# Patient Record
Sex: FEMALE | Race: WHITE | NOT HISPANIC OR LATINO
[De-identification: names, ages, dates, MRNs, and addresses within clinical notes are randomized per-mention and may not be internally consistent; named-entity substitution may affect disease eponyms.]

---

## 2019-07-31 PROBLEM — Z00.00 ENCOUNTER FOR PREVENTIVE HEALTH EXAMINATION: Status: ACTIVE | Noted: 2019-07-31

## 2019-08-09 ENCOUNTER — APPOINTMENT (OUTPATIENT)
Dept: BREAST CENTER | Facility: CLINIC | Age: 84
End: 2019-08-09
Payer: MEDICARE

## 2019-08-09 VITALS
BODY MASS INDEX: 23.75 KG/M2 | SYSTOLIC BLOOD PRESSURE: 159 MMHG | DIASTOLIC BLOOD PRESSURE: 73 MMHG | HEIGHT: 60 IN | WEIGHT: 121 LBS | HEART RATE: 57 BPM

## 2019-08-09 DIAGNOSIS — Z86.59 PERSONAL HISTORY OF OTHER MENTAL AND BEHAVIORAL DISORDERS: ICD-10-CM

## 2019-08-09 DIAGNOSIS — Z87.898 PERSONAL HISTORY OF OTHER SPECIFIED CONDITIONS: ICD-10-CM

## 2019-08-09 DIAGNOSIS — R68.89 OTHER GENERAL SYMPTOMS AND SIGNS: ICD-10-CM

## 2019-08-09 DIAGNOSIS — Z80.3 FAMILY HISTORY OF MALIGNANT NEOPLASM OF BREAST: ICD-10-CM

## 2019-08-09 DIAGNOSIS — Z85.118 PERSONAL HISTORY OF OTHER MALIGNANT NEOPLASM OF BRONCHUS AND LUNG: ICD-10-CM

## 2019-08-09 PROCEDURE — 99205 OFFICE O/P NEW HI 60 MIN: CPT

## 2019-08-09 RX ORDER — ARIPIPRAZOLE 2 MG/1
2 TABLET ORAL
Refills: 0 | Status: ACTIVE | COMMUNITY

## 2019-08-09 RX ORDER — AMLODIPINE BESYLATE 5 MG/1
TABLET ORAL
Refills: 0 | Status: ACTIVE | COMMUNITY

## 2019-08-09 RX ORDER — ASPIRIN 81 MG
81 TABLET, DELAYED RELEASE (ENTERIC COATED) ORAL
Refills: 0 | Status: ACTIVE | COMMUNITY

## 2019-08-09 RX ORDER — CARVEDILOL 3.12 MG/1
TABLET, FILM COATED ORAL
Refills: 0 | Status: ACTIVE | COMMUNITY

## 2019-08-09 RX ORDER — ATORVASTATIN CALCIUM 80 MG/1
TABLET, FILM COATED ORAL
Refills: 0 | Status: ACTIVE | COMMUNITY

## 2019-08-09 RX ORDER — BUPROPION HYDROCHLORIDE 75 MG/1
TABLET, FILM COATED ORAL
Refills: 0 | Status: ACTIVE | COMMUNITY

## 2019-08-09 RX ORDER — LOSARTAN POTASSIUM 100 MG/1
TABLET, FILM COATED ORAL
Refills: 0 | Status: ACTIVE | COMMUNITY

## 2019-08-09 RX ORDER — DONEPEZIL HYDROCHLORIDE 10 MG/1
10 TABLET ORAL
Refills: 0 | Status: ACTIVE | COMMUNITY

## 2019-08-09 RX ORDER — MIRTAZAPINE 7.5 MG/1
TABLET, FILM COATED ORAL
Refills: 0 | Status: ACTIVE | COMMUNITY

## 2019-08-09 NOTE — HISTORY OF PRESENT ILLNESS
[FreeTextEntry1] : Pt w/ L FE lesion detected on Scr Mammo/Sono (7/12/19)\par Mammo/Sono (7/12/19): +new 0.8cm hypoe nod L 2:00 N11 > Bx rec'ed\par S/P L Sono Core Bx (L 2:00)(7/23/19): +FE Lesion w/ cellular stroma\par S/P L Robotic Lobectomy ([facility] [place]): Lung Ca > No RT/chemo\par +FH BR Ca (Daughter 37 (BRCA-))\par No prior Breast Surgery, Breast Procedures or Nipple Discharge. \par No FH Ovarian, Pancreatic Cancer or Melanoma.

## 2019-08-09 NOTE — REVIEW OF SYSTEMS
[Recent Weight Loss (___ Lbs)] : recent [unfilled] ~Ulb weight loss [Difficulty Walking] : difficulty walking [Negative] : Heme/Lymph

## 2019-08-09 NOTE — PHYSICAL EXAM
[Atraumatic] : atraumatic [Normocephalic] : normocephalic [Supple] : supple [No Supraclavicular Adenopathy] : no supraclavicular adenopathy [No Thyromegaly] : no thyromegaly [No Cervical Adenopathy] : no cervical adenopathy [Normal Sinus Rhythm] : normal sinus rhythm [Examined in the supine and seated position] : examined in the supine and seated position [No dominant masses] : no dominant masses in right breast  [No dominant masses] : no dominant masses left breast [No Nipple Retraction] : no left nipple retraction [No Nipple Discharge] : no left nipple discharge [No Axillary Lymphadenopathy] : no left axillary lymphadenopathy [No Edema] : no edema [No Rashes] : no rashes [No Ulceration] : no ulceration [de-identified] : +vague nod th L 2:00 N11 c/t Bx site\par No other sig fx's.

## 2019-09-13 ENCOUNTER — APPOINTMENT (OUTPATIENT)
Dept: BREAST CENTER | Facility: HOSPITAL | Age: 84
End: 2019-09-13
Payer: MEDICARE

## 2019-09-13 PROCEDURE — 19125 EXCISION BREAST LESION: CPT | Mod: LT

## 2019-09-23 ENCOUNTER — APPOINTMENT (OUTPATIENT)
Dept: BREAST CENTER | Facility: CLINIC | Age: 84
End: 2019-09-23
Payer: MEDICARE

## 2019-09-23 DIAGNOSIS — N63.20 UNSPECIFIED LUMP IN THE LEFT BREAST, UNSPECIFIED QUADRANT: ICD-10-CM

## 2019-09-23 PROCEDURE — 99024 POSTOP FOLLOW-UP VISIT: CPT

## 2019-09-23 NOTE — PHYSICAL EXAM
[de-identified] : Pt w/o c/o\par Incision C&D. Mod induration/ecchymosis. PDS d/c'ed.\par Stable. Pathology pending.

## 2019-09-23 NOTE — HISTORY OF PRESENT ILLNESS
[FreeTextEntry1] : S/P L Br Bx w/ NL (9/13/19): p\par Pt w/ L FE lesion detected on Scr Mammo/Sono (7/12/19)\par Mammo/Sono (7/12/19): +new 0.8cm hypoe nod L 2:00 N11 > Bx rec'ed\par S/P L Sono Core Bx (L 2:00)(7/23/19): +FE Lesion w/ cellular stroma\par S/P L Robotic Lobectomy ([facility] [place]): Lung Ca > No RT/chemo\par +FH BR Ca (Daughter 37 (BRCA-))\par No prior Breast Surgery, Breast Procedures or Nipple Discharge. \par No FH Ovarian, Pancreatic Cancer or Melanoma.

## 2022-11-21 ENCOUNTER — OFFICE (OUTPATIENT)
Dept: URBAN - METROPOLITAN AREA CLINIC 122 | Facility: CLINIC | Age: 87
Setting detail: OPHTHALMOLOGY
End: 2022-11-21
Payer: MEDICARE

## 2022-11-21 ENCOUNTER — RX ONLY (RX ONLY)
Age: 87
End: 2022-11-21

## 2022-11-21 DIAGNOSIS — H26.492: ICD-10-CM

## 2022-11-21 PROCEDURE — 66821 AFTER CATARACT LASER SURGERY: CPT | Performed by: OPHTHALMOLOGY

## 2022-11-21 ASSESSMENT — REFRACTION_MANIFEST
OS_AXIS: 90
OD_CYLINDER: -0.75
OS_ADD: +2.50
OS_VA1: 20/25-
OS_CYLINDER: -0.50
OD_AXIS: 90
OD_SPHERE: +0.50
OD_VA1: 20/25-
OD_ADD: +2.50
OS_SPHERE: +0.50

## 2022-11-21 ASSESSMENT — REFRACTION_CURRENTRX
OS_OVR_VA: 20/
OS_CYLINDER: -0.50
OD_AXIS: 90
OD_SPHERE: +3.00
OD_VPRISM_DIRECTION: SV
OS_SPHERE: +3.00
OD_OVR_VA: 20/
OD_CYLINDER: -0.75
OS_VPRISM_DIRECTION: SV
OS_AXIS: 90

## 2022-11-21 ASSESSMENT — VISUAL ACUITY
OS_BCVA: 20/25-
OD_BCVA: 20/50+-

## 2022-11-21 ASSESSMENT — SUPERFICIAL PUNCTATE KERATITIS (SPK)
OD_SPK: 1+
OS_SPK: 1+

## 2022-11-21 ASSESSMENT — LID EXAM ASSESSMENTS
OS_DERMATOCHALASIS: 1+
OD_BLEPHARITIS: RUL 2+
OS_BLEPHARITIS: LUL 2+
OD_DERMATOCHALASIS: 1+

## 2022-11-21 ASSESSMENT — REFRACTION_AUTOREFRACTION
OD_AXIS: 90
OS_AXIS: 90
OS_SPHERE: PLANO
OS_CYLINDER: -1.00
OD_SPHERE: PLANO
OD_CYLINDER: -0.75

## 2022-11-21 ASSESSMENT — SPHEQUIV_DERIVED
OD_SPHEQUIV: 0.125
OS_SPHEQUIV: 0.25

## 2022-11-21 ASSESSMENT — CONFRONTATIONAL VISUAL FIELD TEST (CVF)
OD_FINDINGS: FULL
OS_FINDINGS: FULL

## 2022-11-21 ASSESSMENT — LID POSITION - DERMATOCHALASIS
OD_DERMATOCHALASIS: RUL 2+
OS_DERMATOCHALASIS: LUL 2+

## 2022-12-05 ENCOUNTER — OFFICE (OUTPATIENT)
Dept: URBAN - METROPOLITAN AREA CLINIC 122 | Facility: CLINIC | Age: 87
Setting detail: OPHTHALMOLOGY
End: 2022-12-05
Payer: MEDICARE

## 2022-12-05 DIAGNOSIS — H26.492: ICD-10-CM

## 2022-12-05 DIAGNOSIS — H16.223: ICD-10-CM

## 2022-12-05 PROCEDURE — 99024 POSTOP FOLLOW-UP VISIT: CPT | Performed by: OPHTHALMOLOGY

## 2022-12-05 ASSESSMENT — REFRACTION_AUTOREFRACTION
OD_SPHERE: PLANO
OS_AXIS: 103
OD_AXIS: 90
OD_CYLINDER: -0.75
OS_CYLINDER: -0.50
OS_SPHERE: +0.50

## 2022-12-05 ASSESSMENT — REFRACTION_MANIFEST
OD_ADD: +2.50
OD_SPHERE: +0.50
OD_CYLINDER: -0.75
OS_CYLINDER: -0.50
OS_ADD: +2.50
OS_SPHERE: +0.50
OD_VA1: 20/25-
OS_AXIS: 105
OS_VA1: 20/25
OD_AXIS: 90

## 2022-12-05 ASSESSMENT — REFRACTION_CURRENTRX
OS_CYLINDER: -0.50
OD_SPHERE: +3.00
OS_AXIS: 90
OD_VPRISM_DIRECTION: SV
OD_OVR_VA: 20/
OS_SPHERE: +3.00
OD_CYLINDER: -0.75
OS_VPRISM_DIRECTION: SV
OS_OVR_VA: 20/
OD_AXIS: 90

## 2022-12-05 ASSESSMENT — CONFRONTATIONAL VISUAL FIELD TEST (CVF)
OD_FINDINGS: FULL
OS_FINDINGS: FULL

## 2022-12-05 ASSESSMENT — LID POSITION - DERMATOCHALASIS
OS_DERMATOCHALASIS: LUL 2+
OD_DERMATOCHALASIS: RUL 2+

## 2022-12-05 ASSESSMENT — SPHEQUIV_DERIVED
OS_SPHEQUIV: 0.25
OD_SPHEQUIV: 0.125
OS_SPHEQUIV: 0.25

## 2022-12-05 ASSESSMENT — LID EXAM ASSESSMENTS
OS_BLEPHARITIS: LUL 2+
OS_DERMATOCHALASIS: 1+
OD_BLEPHARITIS: RUL 2+
OD_DERMATOCHALASIS: 1+

## 2022-12-05 ASSESSMENT — SUPERFICIAL PUNCTATE KERATITIS (SPK)
OS_SPK: 1+
OD_SPK: 1+

## 2022-12-05 ASSESSMENT — VISUAL ACUITY
OS_BCVA: 20/25-
OD_BCVA: 20/30

## 2023-03-13 ENCOUNTER — OFFICE (OUTPATIENT)
Dept: URBAN - METROPOLITAN AREA CLINIC 122 | Facility: CLINIC | Age: 88
Setting detail: OPHTHALMOLOGY
End: 2023-03-13
Payer: MEDICARE

## 2023-03-13 DIAGNOSIS — H26.492: ICD-10-CM

## 2023-03-13 DIAGNOSIS — H16.223: ICD-10-CM

## 2023-03-13 PROCEDURE — 68761 CLOSE TEAR DUCT OPENING: CPT | Performed by: OPHTHALMOLOGY

## 2023-03-13 PROCEDURE — 99213 OFFICE O/P EST LOW 20 MIN: CPT | Performed by: OPHTHALMOLOGY

## 2023-03-13 ASSESSMENT — REFRACTION_MANIFEST
OS_VA1: 20/25
OS_SPHERE: +0.50
OS_ADD: +2.50
OD_ADD: +2.50
OD_CYLINDER: -0.75
OD_VA1: 20/25-
OD_AXIS: 90
OS_AXIS: 105
OS_CYLINDER: -0.50
OD_SPHERE: +0.50

## 2023-03-13 ASSESSMENT — CONFRONTATIONAL VISUAL FIELD TEST (CVF)
OS_FINDINGS: FULL
OD_FINDINGS: FULL

## 2023-03-13 ASSESSMENT — REFRACTION_CURRENTRX
OD_CYLINDER: -0.75
OS_SPHERE: +3.00
OS_VPRISM_DIRECTION: SV
OD_AXIS: 90
OS_AXIS: 90
OD_OVR_VA: 20/
OS_CYLINDER: -0.50
OS_OVR_VA: 20/
OD_VPRISM_DIRECTION: SV
OD_SPHERE: +3.00

## 2023-03-13 ASSESSMENT — LID EXAM ASSESSMENTS
OD_BLEPHARITIS: RUL 2+
OS_BLEPHARITIS: LUL 2+
OS_DERMATOCHALASIS: 1+
OD_DERMATOCHALASIS: 1+

## 2023-03-13 ASSESSMENT — SPHEQUIV_DERIVED
OS_SPHEQUIV: 0.125
OS_SPHEQUIV: 0.25
OD_SPHEQUIV: 0.125
OD_SPHEQUIV: 0.625

## 2023-03-13 ASSESSMENT — REFRACTION_AUTOREFRACTION
OD_CYLINDER: -0.75
OD_AXIS: 116
OS_SPHERE: +0.25
OS_AXIS: 162
OD_SPHERE: +1.00
OS_CYLINDER: -0.25

## 2023-03-13 ASSESSMENT — SUPERFICIAL PUNCTATE KERATITIS (SPK)
OS_SPK: 2+
OD_SPK: 1+

## 2023-03-13 ASSESSMENT — VISUAL ACUITY
OS_BCVA: 20/25-
OD_BCVA: 20/40-2

## 2023-03-13 ASSESSMENT — LID POSITION - DERMATOCHALASIS
OS_DERMATOCHALASIS: LUL 2+
OD_DERMATOCHALASIS: RUL 2+

## 2023-04-03 ENCOUNTER — OFFICE (OUTPATIENT)
Dept: URBAN - METROPOLITAN AREA CLINIC 122 | Facility: CLINIC | Age: 88
Setting detail: OPHTHALMOLOGY
End: 2023-04-03
Payer: MEDICARE

## 2023-04-03 DIAGNOSIS — H43.391: ICD-10-CM

## 2023-04-03 DIAGNOSIS — H35.033: ICD-10-CM

## 2023-04-03 DIAGNOSIS — H16.223: ICD-10-CM

## 2023-04-03 DIAGNOSIS — H01.001: ICD-10-CM

## 2023-04-03 DIAGNOSIS — H02.834: ICD-10-CM

## 2023-04-03 DIAGNOSIS — H02.831: ICD-10-CM

## 2023-04-03 DIAGNOSIS — H26.492: ICD-10-CM

## 2023-04-03 DIAGNOSIS — H16.252: ICD-10-CM

## 2023-04-03 DIAGNOSIS — H01.004: ICD-10-CM

## 2023-04-03 DIAGNOSIS — H35.352: ICD-10-CM

## 2023-04-03 PROCEDURE — 99214 OFFICE O/P EST MOD 30 MIN: CPT | Performed by: OPHTHALMOLOGY

## 2023-04-03 PROCEDURE — 92134 CPTRZ OPH DX IMG PST SGM RTA: CPT | Performed by: OPHTHALMOLOGY

## 2023-04-03 ASSESSMENT — REFRACTION_AUTOREFRACTION
OD_SPHERE: +1.00
OD_CYLINDER: -1.00
OS_CYLINDER: -0.25
OS_AXIS: 162
OD_AXIS: 92
OS_SPHERE: +0.50

## 2023-04-03 ASSESSMENT — REFRACTION_CURRENTRX
OD_OVR_VA: 20/
OS_OVR_VA: 20/
OD_AXIS: 90
OS_SPHERE: +3.00
OD_CYLINDER: -0.75
OS_VPRISM_DIRECTION: SV
OS_AXIS: 90
OD_SPHERE: +3.00
OD_VPRISM_DIRECTION: SV
OS_CYLINDER: -0.50

## 2023-04-03 ASSESSMENT — REFRACTION_MANIFEST
OS_VA1: 20/25
OD_CYLINDER: -0.75
OS_CYLINDER: -0.50
OD_VA1: 20/25-
OS_AXIS: 105
OD_AXIS: 90
OD_ADD: +2.50
OS_ADD: +2.50
OS_SPHERE: +0.50
OD_SPHERE: +0.50

## 2023-04-03 ASSESSMENT — SUPERFICIAL PUNCTATE KERATITIS (SPK)
OD_SPK: T
OS_SPK: 2+

## 2023-04-03 ASSESSMENT — LID EXAM ASSESSMENTS
OS_DERMATOCHALASIS: 1+
OD_BLEPHARITIS: RUL 2+
OS_BLEPHARITIS: LUL 2+
OD_DERMATOCHALASIS: 1+

## 2023-04-03 ASSESSMENT — LID POSITION - DERMATOCHALASIS
OD_DERMATOCHALASIS: RUL 2+
OS_DERMATOCHALASIS: LUL 2+

## 2023-04-03 ASSESSMENT — CONFRONTATIONAL VISUAL FIELD TEST (CVF)
OS_FINDINGS: FULL
OD_FINDINGS: FULL

## 2023-04-03 ASSESSMENT — SPHEQUIV_DERIVED
OS_SPHEQUIV: 0.375
OD_SPHEQUIV: 0.5
OS_SPHEQUIV: 0.25
OD_SPHEQUIV: 0.125

## 2023-04-03 ASSESSMENT — VISUAL ACUITY
OD_BCVA: 20/80
OS_BCVA: 20/25-

## 2023-04-17 ENCOUNTER — OFFICE (OUTPATIENT)
Dept: URBAN - METROPOLITAN AREA CLINIC 122 | Facility: CLINIC | Age: 88
Setting detail: OPHTHALMOLOGY
End: 2023-04-17
Payer: MEDICARE

## 2023-04-17 ENCOUNTER — RX ONLY (RX ONLY)
Age: 88
End: 2023-04-17

## 2023-04-17 DIAGNOSIS — H26.492: ICD-10-CM

## 2023-04-17 DIAGNOSIS — H02.834: ICD-10-CM

## 2023-04-17 DIAGNOSIS — H43.391: ICD-10-CM

## 2023-04-17 DIAGNOSIS — H02.831: ICD-10-CM

## 2023-04-17 DIAGNOSIS — H01.001: ICD-10-CM

## 2023-04-17 DIAGNOSIS — H35.033: ICD-10-CM

## 2023-04-17 DIAGNOSIS — H35.352: ICD-10-CM

## 2023-04-17 DIAGNOSIS — H01.004: ICD-10-CM

## 2023-04-17 DIAGNOSIS — H16.252: ICD-10-CM

## 2023-04-17 PROCEDURE — 99213 OFFICE O/P EST LOW 20 MIN: CPT | Performed by: OPHTHALMOLOGY

## 2023-04-17 PROCEDURE — 92134 CPTRZ OPH DX IMG PST SGM RTA: CPT | Performed by: OPHTHALMOLOGY

## 2023-04-17 ASSESSMENT — REFRACTION_AUTOREFRACTION
OS_SPHERE: +0.50
OD_SPHERE: +1.00
OS_CYLINDER: -0.25
OD_AXIS: 92
OS_AXIS: 162
OD_CYLINDER: -1.00

## 2023-04-17 ASSESSMENT — REFRACTION_MANIFEST
OS_ADD: +2.50
OS_VA1: 20/25
OD_SPHERE: +0.50
OD_ADD: +2.50
OS_AXIS: 105
OD_VA1: 20/25-
OS_SPHERE: +0.50
OD_CYLINDER: -0.75
OD_AXIS: 90
OS_CYLINDER: -0.50

## 2023-04-17 ASSESSMENT — LID POSITION - DERMATOCHALASIS
OS_DERMATOCHALASIS: LUL 2+
OD_DERMATOCHALASIS: RUL 2+

## 2023-04-17 ASSESSMENT — SPHEQUIV_DERIVED
OS_SPHEQUIV: 0.25
OD_SPHEQUIV: 0.125
OS_SPHEQUIV: 0.375
OD_SPHEQUIV: 0.5

## 2023-04-17 ASSESSMENT — REFRACTION_CURRENTRX
OD_AXIS: 90
OS_VPRISM_DIRECTION: SV
OS_SPHERE: +3.00
OD_CYLINDER: -0.75
OS_OVR_VA: 20/
OD_OVR_VA: 20/
OS_AXIS: 90
OD_VPRISM_DIRECTION: SV
OD_SPHERE: +3.00
OS_CYLINDER: -0.50

## 2023-04-17 ASSESSMENT — LID EXAM ASSESSMENTS
OS_BLEPHARITIS: LUL 2+
OS_DERMATOCHALASIS: 1+
OD_BLEPHARITIS: RUL 2+
OD_DERMATOCHALASIS: 1+

## 2023-04-17 ASSESSMENT — SUPERFICIAL PUNCTATE KERATITIS (SPK)
OS_SPK: 2+
OD_SPK: T

## 2023-04-17 ASSESSMENT — VISUAL ACUITY
OS_BCVA: 20/25-
OD_BCVA: 20/100-

## 2023-07-17 ENCOUNTER — OFFICE (OUTPATIENT)
Dept: URBAN - METROPOLITAN AREA CLINIC 122 | Facility: CLINIC | Age: 88
Setting detail: OPHTHALMOLOGY
End: 2023-07-17
Payer: MEDICARE

## 2023-07-17 DIAGNOSIS — H16.223: ICD-10-CM

## 2023-07-17 DIAGNOSIS — H16.252: ICD-10-CM

## 2023-07-17 DIAGNOSIS — H02.834: ICD-10-CM

## 2023-07-17 DIAGNOSIS — H02.831: ICD-10-CM

## 2023-07-17 DIAGNOSIS — H43.391: ICD-10-CM

## 2023-07-17 DIAGNOSIS — H26.492: ICD-10-CM

## 2023-07-17 DIAGNOSIS — H01.004: ICD-10-CM

## 2023-07-17 DIAGNOSIS — H01.001: ICD-10-CM

## 2023-07-17 DIAGNOSIS — H35.033: ICD-10-CM

## 2023-07-17 DIAGNOSIS — H35.352: ICD-10-CM

## 2023-07-17 PROCEDURE — 92134 CPTRZ OPH DX IMG PST SGM RTA: CPT | Performed by: OPHTHALMOLOGY

## 2023-07-17 PROCEDURE — 99213 OFFICE O/P EST LOW 20 MIN: CPT | Performed by: OPHTHALMOLOGY

## 2023-07-17 ASSESSMENT — REFRACTION_AUTOREFRACTION
OS_CYLINDER: -0.25
OD_AXIS: 92
OS_AXIS: 162
OD_SPHERE: +1.00
OS_SPHERE: +0.50
OD_CYLINDER: -1.00

## 2023-07-17 ASSESSMENT — REFRACTION_CURRENTRX
OS_AXIS: 90
OS_SPHERE: +3.00
OD_AXIS: 90
OS_CYLINDER: -0.50
OS_VPRISM_DIRECTION: SV
OD_VPRISM_DIRECTION: SV
OS_OVR_VA: 20/
OD_SPHERE: +3.00
OD_OVR_VA: 20/
OD_CYLINDER: -0.75

## 2023-07-17 ASSESSMENT — REFRACTION_MANIFEST
OS_AXIS: 105
OS_ADD: +2.50
OS_CYLINDER: -0.50
OD_AXIS: 90
OD_VA1: 20/25-
OS_VA1: 20/25
OS_SPHERE: +0.50
OD_ADD: +2.50
OD_CYLINDER: -0.75
OD_SPHERE: +0.50

## 2023-07-17 ASSESSMENT — SUPERFICIAL PUNCTATE KERATITIS (SPK)
OD_SPK: T
OS_SPK: 2+

## 2023-07-17 ASSESSMENT — VISUAL ACUITY
OD_BCVA: 20/25+
OS_BCVA: 20/20

## 2023-07-17 ASSESSMENT — LID POSITION - DERMATOCHALASIS
OS_DERMATOCHALASIS: LUL 2+
OD_DERMATOCHALASIS: RUL 2+

## 2023-07-17 ASSESSMENT — LID EXAM ASSESSMENTS
OS_DERMATOCHALASIS: 1+
OD_BLEPHARITIS: RUL 2+
OD_DERMATOCHALASIS: 1+
OS_BLEPHARITIS: LUL 2+

## 2023-07-17 ASSESSMENT — SPHEQUIV_DERIVED
OS_SPHEQUIV: 0.375
OD_SPHEQUIV: 0.125
OD_SPHEQUIV: 0.5
OS_SPHEQUIV: 0.25

## 2023-10-18 ENCOUNTER — OFFICE (OUTPATIENT)
Dept: URBAN - METROPOLITAN AREA CLINIC 122 | Facility: CLINIC | Age: 88
Setting detail: OPHTHALMOLOGY
End: 2023-10-18
Payer: MEDICARE

## 2023-10-18 DIAGNOSIS — H16.223: ICD-10-CM

## 2023-10-18 PROCEDURE — 68761 CLOSE TEAR DUCT OPENING: CPT | Mod: 50 | Performed by: OPHTHALMOLOGY

## 2023-10-18 ASSESSMENT — REFRACTION_MANIFEST
OS_SPHERE: +0.50
OD_AXIS: 90
OD_VA1: 20/25-
OD_CYLINDER: -0.75
OD_SPHERE: +0.50
OS_CYLINDER: -0.50
OS_VA1: 20/25
OS_ADD: +2.50
OS_AXIS: 105
OD_ADD: +2.50

## 2023-10-18 ASSESSMENT — REFRACTION_AUTOREFRACTION
OD_AXIS: 92
OD_CYLINDER: -1.00
OS_AXIS: 162
OS_SPHERE: +0.50
OS_CYLINDER: -0.25
OD_SPHERE: +1.00

## 2023-10-18 ASSESSMENT — VISUAL ACUITY
OD_BCVA: 20/40+2
OS_BCVA: 20/20-2

## 2023-10-18 ASSESSMENT — REFRACTION_CURRENTRX
OS_AXIS: 90
OS_OVR_VA: 20/
OD_VPRISM_DIRECTION: SV
OD_SPHERE: +3.00
OD_OVR_VA: 20/
OS_VPRISM_DIRECTION: SV
OD_AXIS: 90
OS_CYLINDER: -0.50
OD_CYLINDER: -0.75
OS_SPHERE: +3.00

## 2023-10-18 ASSESSMENT — SUPERFICIAL PUNCTATE KERATITIS (SPK)
OD_SPK: T
OS_SPK: 2+

## 2023-10-18 ASSESSMENT — SPHEQUIV_DERIVED
OS_SPHEQUIV: 0.375
OD_SPHEQUIV: 0.125
OD_SPHEQUIV: 0.5
OS_SPHEQUIV: 0.25

## 2023-10-18 ASSESSMENT — LID EXAM ASSESSMENTS
OS_DERMATOCHALASIS: 1+
OS_BLEPHARITIS: LUL 2+
OD_BLEPHARITIS: RUL 2+
OD_DERMATOCHALASIS: 1+

## 2023-10-18 ASSESSMENT — LID POSITION - DERMATOCHALASIS
OS_DERMATOCHALASIS: LUL 2+
OD_DERMATOCHALASIS: RUL 2+

## 2024-04-17 ENCOUNTER — RX ONLY (RX ONLY)
Age: 89
End: 2024-04-17

## 2024-04-17 ENCOUNTER — OFFICE (OUTPATIENT)
Dept: URBAN - METROPOLITAN AREA CLINIC 122 | Facility: CLINIC | Age: 89
Setting detail: OPHTHALMOLOGY
End: 2024-04-17
Payer: MEDICARE

## 2024-04-17 DIAGNOSIS — H16.223: ICD-10-CM

## 2024-04-17 DIAGNOSIS — H43.391: ICD-10-CM

## 2024-04-17 DIAGNOSIS — H26.492: ICD-10-CM

## 2024-04-17 DIAGNOSIS — H35.352: ICD-10-CM

## 2024-04-17 DIAGNOSIS — H02.831: ICD-10-CM

## 2024-04-17 PROCEDURE — 68761 CLOSE TEAR DUCT OPENING: CPT | Mod: 50 | Performed by: OPHTHALMOLOGY

## 2024-04-17 PROCEDURE — 92014 COMPRE OPH EXAM EST PT 1/>: CPT | Mod: 25 | Performed by: OPHTHALMOLOGY

## 2024-04-17 PROCEDURE — 92250 FUNDUS PHOTOGRAPHY W/I&R: CPT | Performed by: OPHTHALMOLOGY

## 2024-04-17 ASSESSMENT — LID EXAM ASSESSMENTS
OD_BLEPHARITIS: RUL 2+
OS_DERMATOCHALASIS: 1+
OS_BLEPHARITIS: LUL 2+
OD_DERMATOCHALASIS: 1+

## 2024-04-17 ASSESSMENT — LID POSITION - DERMATOCHALASIS
OD_DERMATOCHALASIS: RUL 2+
OS_DERMATOCHALASIS: LUL 2+

## 2024-10-30 ENCOUNTER — OFFICE (OUTPATIENT)
Dept: URBAN - METROPOLITAN AREA CLINIC 122 | Facility: CLINIC | Age: 89
Setting detail: OPHTHALMOLOGY
End: 2024-10-30
Payer: MEDICARE

## 2024-10-30 DIAGNOSIS — H16.223: ICD-10-CM

## 2024-10-30 DIAGNOSIS — H02.831: ICD-10-CM

## 2024-10-30 DIAGNOSIS — H43.391: ICD-10-CM

## 2024-10-30 DIAGNOSIS — H35.352: ICD-10-CM

## 2024-10-30 DIAGNOSIS — H35.033: ICD-10-CM

## 2024-10-30 DIAGNOSIS — H26.492: ICD-10-CM

## 2024-10-30 PROBLEM — H02.834 DERMATOCHALASIS; RIGHT UPPER LID, LEFT UPPER LID: Status: ACTIVE | Noted: 2024-10-30

## 2024-10-30 PROCEDURE — 68761 CLOSE TEAR DUCT OPENING: CPT | Mod: 50 | Performed by: OPHTHALMOLOGY

## 2024-10-30 PROCEDURE — 92134 CPTRZ OPH DX IMG PST SGM RTA: CPT | Performed by: OPHTHALMOLOGY

## 2024-10-30 PROCEDURE — 99213 OFFICE O/P EST LOW 20 MIN: CPT | Mod: 25 | Performed by: OPHTHALMOLOGY

## 2024-10-30 ASSESSMENT — REFRACTION_AUTOREFRACTION
OD_AXIS: 92
OS_AXIS: 162
OD_SPHERE: +1.00
OS_CYLINDER: -0.25
OD_CYLINDER: -1.00
OS_SPHERE: +0.50

## 2024-10-30 ASSESSMENT — VISUAL ACUITY
OS_BCVA: 20/30
OD_BCVA: 20/30+2

## 2024-10-30 ASSESSMENT — LID POSITION - DERMATOCHALASIS
OS_DERMATOCHALASIS: LUL 2+
OD_DERMATOCHALASIS: RUL 2+

## 2024-10-30 ASSESSMENT — REFRACTION_MANIFEST
OD_AXIS: 90
OS_CYLINDER: -0.50
OD_ADD: +2.50
OS_SPHERE: +0.50
OD_SPHERE: +0.50
OD_CYLINDER: -0.75
OD_VA1: 20/25-
OS_VA1: 20/25
OS_ADD: +2.50
OS_AXIS: 105

## 2024-10-30 ASSESSMENT — REFRACTION_CURRENTRX
OS_CYLINDER: -0.50
OD_VPRISM_DIRECTION: SV
OS_SPHERE: +3.00
OS_OVR_VA: 20/
OD_SPHERE: +3.00
OS_AXIS: 90
OD_AXIS: 90
OD_OVR_VA: 20/
OD_CYLINDER: -0.75
OS_VPRISM_DIRECTION: SV

## 2024-10-30 ASSESSMENT — LID EXAM ASSESSMENTS
OD_BLEPHARITIS: RUL 2+
OD_DERMATOCHALASIS: 1+
OS_BLEPHARITIS: LUL 2+
OS_DERMATOCHALASIS: 1+

## 2024-10-30 ASSESSMENT — SUPERFICIAL PUNCTATE KERATITIS (SPK)
OS_SPK: 2+
OD_SPK: T

## 2025-01-03 ENCOUNTER — TRANSCRIPTION ENCOUNTER (OUTPATIENT)
Age: 89
End: 2025-01-03

## 2025-01-04 ENCOUNTER — TRANSCRIPTION ENCOUNTER (OUTPATIENT)
Age: 89
End: 2025-01-04

## 2025-01-06 ENCOUNTER — TRANSCRIPTION ENCOUNTER (OUTPATIENT)
Age: 89
End: 2025-01-06

## 2025-04-30 ENCOUNTER — OFFICE (OUTPATIENT)
Dept: URBAN - METROPOLITAN AREA CLINIC 122 | Facility: CLINIC | Age: 89
Setting detail: OPHTHALMOLOGY
End: 2025-04-30
Payer: MEDICARE

## 2025-04-30 DIAGNOSIS — H16.223: ICD-10-CM

## 2025-04-30 DIAGNOSIS — H35.352: ICD-10-CM

## 2025-04-30 DIAGNOSIS — H02.834: ICD-10-CM

## 2025-04-30 DIAGNOSIS — H43.391: ICD-10-CM

## 2025-04-30 DIAGNOSIS — H01.001: ICD-10-CM

## 2025-04-30 DIAGNOSIS — H02.831: ICD-10-CM

## 2025-04-30 DIAGNOSIS — H26.492: ICD-10-CM

## 2025-04-30 DIAGNOSIS — H01.004: ICD-10-CM

## 2025-04-30 DIAGNOSIS — H35.033: ICD-10-CM

## 2025-04-30 PROCEDURE — 92250 FUNDUS PHOTOGRAPHY W/I&R: CPT | Performed by: OPHTHALMOLOGY

## 2025-04-30 PROCEDURE — 92014 COMPRE OPH EXAM EST PT 1/>: CPT | Mod: 25 | Performed by: OPHTHALMOLOGY

## 2025-04-30 PROCEDURE — 68761 CLOSE TEAR DUCT OPENING: CPT | Mod: 50 | Performed by: OPHTHALMOLOGY

## 2025-04-30 ASSESSMENT — REFRACTION_MANIFEST
OS_AXIS: 105
OD_VA1: 20/25-
OD_ADD: +2.50
OD_SPHERE: +0.50
OD_CYLINDER: -0.75
OD_AXIS: 90
OS_VA1: 20/25
OS_CYLINDER: -0.50
OS_SPHERE: +0.50
OS_ADD: +2.50

## 2025-04-30 ASSESSMENT — REFRACTION_CURRENTRX
OS_SPHERE: +3.00
OD_VPRISM_DIRECTION: SV
OD_SPHERE: +3.00
OS_VPRISM_DIRECTION: SV
OS_AXIS: 90
OD_AXIS: 90
OD_OVR_VA: 20/
OS_CYLINDER: -0.50
OS_OVR_VA: 20/
OD_CYLINDER: -0.75

## 2025-04-30 ASSESSMENT — VISUAL ACUITY
OS_BCVA: 20/20
OD_BCVA: 20/30+2

## 2025-04-30 ASSESSMENT — CONFRONTATIONAL VISUAL FIELD TEST (CVF)
OS_FINDINGS: FULL
OD_FINDINGS: FULL